# Patient Record
Sex: FEMALE | Race: WHITE
[De-identification: names, ages, dates, MRNs, and addresses within clinical notes are randomized per-mention and may not be internally consistent; named-entity substitution may affect disease eponyms.]

---

## 2017-03-18 ENCOUNTER — HOSPITAL ENCOUNTER (EMERGENCY)
Dept: HOSPITAL 62 - ER | Age: 37
Discharge: HOME | End: 2017-03-18
Payer: COMMERCIAL

## 2017-03-18 VITALS — SYSTOLIC BLOOD PRESSURE: 138 MMHG | DIASTOLIC BLOOD PRESSURE: 74 MMHG

## 2017-03-18 DIAGNOSIS — J06.9: Primary | ICD-10-CM

## 2017-03-18 DIAGNOSIS — R68.84: ICD-10-CM

## 2017-03-18 DIAGNOSIS — J02.9: ICD-10-CM

## 2017-03-18 PROCEDURE — 87880 STREP A ASSAY W/OPTIC: CPT

## 2017-03-18 PROCEDURE — 87804 INFLUENZA ASSAY W/OPTIC: CPT

## 2017-03-18 PROCEDURE — 87070 CULTURE OTHR SPECIMN AEROBIC: CPT

## 2017-03-18 PROCEDURE — 99283 EMERGENCY DEPT VISIT LOW MDM: CPT

## 2017-03-18 NOTE — ER DOCUMENT REPORT
ED Flu Like





- General


Chief Complaint: Sore Throat


Stated Complaint: JAW PAIN/SWELLING


Time seen by provider: 17:50


Mode of Arrival: Ambulatory


Information source: Patient


Notes: 


36-year-old female presents to ED for cough runny nose postnasal drip with sore 

throat for 2 weeks she states is getting worse in the last 7 days.  No fevers.  

Last menstrual period was 02/29/2017


TRAVEL OUTSIDE OF THE [place] IN LAST 30 DAYS: No





- HPI


Onset: Other - 2 weeks


Timing/Duration: Constant


Quality of pain: Achy, Other - Sore throat


Severity: Moderate


Pain Level: 3


CO exposure: No


Associated symptoms: Body/muscle aches, Rhinnorhea, Sinus pain/drainage, Sore 

throat


Similar symptoms previously: Yes


Recently seen / treated by doctor: No





- Related Data


Allergies/Adverse Reactions: 


 





latex Allergy (Verified 03/18/17 16:22)


 


pantoprazole [From Protonix] Allergy (Verified 03/18/17 16:22)


 











Past Medical History





- General


Information source: Patient





- Social History


Smoking Status: Never Smoker


Cigarette use (# per day): No


Chew tobacco use (# tins/day): No


Smoking Education Provided: No


Frequency of alcohol use: None


Drug Abuse: None


Family History: Reviewed & Not Pertinent


Patient has suicidal ideation: No


Patient has homicidal ideation: No





- Past Medical History


Cardiac Medical History: Reports: None


Pulmonary Medical History: Reports: None


EENT Medical History: Reports: None


Neurological Medical History: Reports: None


Endocrine Medical History: Reports: None


Renal/ Medical History: Reports: None


Malignancy Medical History: Reports: None


GI Medical History: Reports: None


Musculoskeltal Medical History: Reports None


Skin Medical History: Reports None


Psychiatric Medical History: Reports: None


Traumatic Medical History: Reports: None


Infectious Medical History: Reports: None


Surgical Hx: Negative


Past Surgical History: Reports: None





Review of Systems





- Review of Systems


Constitutional: Recent illness


EENT: Nose discharge, Sinus discharge, Throat pain, Difficulty swallowing


Cardiovascular: No symptoms reported


Respiratory: No symptoms reported


Gastrointestinal: No symptoms reported


Genitourinary: No symptoms reported


Female Genitourinary: No symptoms reported


Musculoskeletal: No symptoms reported


Skin: No symptoms reported


Hematologic/Lymphatic: No symptoms reported


Neurological/Psychological: No symptoms reported


-: Yes All other systems reviewed and negative





Physical Exam





- Vital signs


Vitals: 


 











Temp Pulse Resp BP Pulse Ox


 


 98.4 F   84   16   141/74 H  95 


 


 03/18/17 16:17  03/18/17 16:17  03/18/17 16:17  03/18/17 16:17  03/18/17 16:17











Interpretation: Normal





- General


General appearance: Appears well, Alert





- HEENT


Head: Normocephalic, Atraumatic


Eyes: Normal


Pupils: PERRL


Ears: Normal


External canal: Normal


Tympanic membrane: Normal


Sinus: Normal


Nasal: Purulent discharge, Swelling


Mouth/Lips: Normal


Mucous membranes: Normal


Pharynx: Post nasal drainage.  No: Erythema, Exudate, Peritonsillar abscess, 

Tonsillar hypertrophy, Uvular edema, Potential airway comprom.


Neck: Normal





- Respiratory


Respiratory status: No respiratory distress


Chest status: Nontender


Breath sounds: Nonproductive cough


Chest palpation: Normal





- Cardiovascular


Rhythm: Regular


Heart sounds: Normal auscultation


Murmur: No





- Abdominal


Inspection: Normal


Distension: No distension


Bowel sounds: Normal


Tenderness: Nontender


Organomegaly: No organomegaly





- Back


Back: Normal, Nontender





- Extremities


General upper extremity: Normal inspection, Nontender, Normal color, Normal ROM

, Normal temperature


General lower extremity: Normal inspection, Nontender, Normal color, Normal ROM

, Normal temperature, Normal weight bearing.  No: Tatianna's sign





- Neurological


Neuro grossly intact: Yes


Cognition: Normal


Orientation: AAOx4


[place] Coma Scale Eye Opening: Spontaneous


[place] Coma Scale Verbal: Oriented


[place] Coma Scale Motor: Obeys Commands


[place] Coma Scale Total: 15


Speech: Normal


Motor strength normal: LUE, RUE, LLE, RLE


Sensory: Normal





- Psychological


Associated symptoms: Normal affect, Normal mood





- Skin


Skin Temperature: Warm


Skin Moisture: Dry


Skin Color: Normal





Course





- Re-evaluation


Re-evalutation: 





03/18/17 18:57


Consult to Dr. Erazo as the patient states that the right side of her face is 

swollen.  He states there might be minimal swelling but nothing of concern 

discussed limiting drops and Cipro can do with the patient.  Patient verbalized 

understanding.





- Vital Signs


Vital signs: 


 











Temp Pulse Resp BP Pulse Ox


 


 98.4 F   84   16   141/74 H  95 


 


 03/18/17 16:17  03/18/17 16:17  03/18/17 16:17  03/18/17 16:17  03/18/17 16:17














Discharge





- Discharge


Clinical Impression: 


Upper respiratory infection


Qualifiers:


 URI type: unspecified URI Qualified Code(s): J06.9 - Acute upper respiratory 

infection, unspecified





Condition: Stable


Disposition: HOME, SELF-CARE


Instructions:  Family Physicians / Practices


Additional Instructions: 


UPPER RESPIRATORY ILLNESS:





     You have a viral infection of the respiratory passages -- a "cold."  This 

common infection causes nasal congestion, drainage, and often sore throat and 

cough.  It is highly contagious.  The disease usually lasts about 10 to 14 days.


     There is no "cure" for the viral infection -- it must run its course.  If 

there is a complication, such as bacterial infection in the nose, sinuses, 

middle ear, or bronchial tubes, antibiotics may be required.  The antibiotics 

won't affect the virus.


     Drink plenty of fluids.  A humidifier may help.  An expectorant medication 

or decongestant may make you more comfortable.  Use acetaminophen or ibuprofen 

for fever or aches.


     See the doctor if fever persists over two days, if there is any 

significant worsening of your symptoms, or if you simply fail to improve as 

expected.








DECONGESTANT MEDICATION:


     A decongestant medicine has been suggested.  Often this medicine is 

combined in the same tablet with an antihistamine or expectorant. This type of 

medicine is helpful in treating a bad cold or sinus condition, as well as in 

treatment of the nasal congestion of hay fever.  It is not of much benefit for 

lung infections.


     Decongestant medicines are related to stimulants.  They can cause an 

increase in blood pressure and heart rate.  Persons with heart disease and high 

blood pressure should not take decongestants without discussing this with the 

physician.


     If you develop palpitations, chest pain, headache, or tremors, stop the 

medicine and consult your physician.








COUGH-SUPPRESSANT & EXPECTORANT MEDICATION:


     You are to use a cough medication as needed for relief of symptoms.  This 

medicine is a combination of an expectorant (to make the mucous thinner and 

more easily "coughed up") and a cough suppressant (to reduce the frequency of 

coughing).


     The cough-suppressant medicine is related to narcotics.  You may 

experience mild nausea and sleepiness.  Some patients who are very sensitive to 

narcotics may have stomach pain from this medicine. Taking the medicine with 

food reduces these side effects.  Do not drive or work with machinery until you 

know how this medicine affects you.


     The expectorant should have no side effects.  Iodine-containing 

expectorants (such as organidin) should not be taken by persons with active 

thyroid disease unless approved by your doctor.


     Call the doctor if you develop shortness of breath, hives, rash, itching, 

lightheadedness, or severe nausea and vomiting.








USE OF ACETAMINOPHEN (Tylenol):


     Acetaminophen may be taken for pain relief or fever control. It's much 

safer than aspirin, offering a wider range of "safe" dosages.  It is safe 

during pregnancy.  Some brand names are Tylenol, Panadol, Datril, Anacin 3, 

Tempra, and Liquiprin. Acetaminophen can be repeated every four hours.  The 

following are maximum recommended dosages:


>89 pounds or adults          650 mg to 900 mg


Acetaminophen can be repeated every four hours.  Maximum dose not to exceed 

4000 mg a day.








SMOKING:


     If you smoke, you should stop smoking.  The tar and chemicals in cigarette 

smoke are harmful.  Smoking has been shown to cause:


          emphysema


          chronic bronchitis


          lung cancer


          mouth and throat cancer


          stomach and pancreas cancer


          premature aging


          birth defects


     In addition, smoking increases ear and lung infections in children of 

smokers.








FOLLOW-UP CARE:


If you have been referred to a physician for follow-up care, call the physician

s office for an appointment as you were instructed or within the next two days.

  If you experience worsening or a significant change in your symptoms, notify 

the physician immediately or return to the Emergency Department at any time for 

re-evaluation.





Forms:  Elevated Blood Pressure, Return to Work

## 2017-03-18 NOTE — ER DOCUMENT REPORT
ED Medical Screen (RME)





- General


Stated Complaint: JAW PAIN/SWELLING


Notes: 


Patient is a 36-year-old female presents emergency Department complaining of 

difficulty swallowing/sore throat for the past 2 weeks but is worse over the 

past 7 days.  Denies any fevers, chills.  Patient states that she never 

received a MMR vaccine.





Neck with range of motion.  Patient nontoxic looking.








I have greeted and performed a rapid initial assessment of this patient. A 

comprehensive ED assessment and evaluation of the patient, analysis of test 

results and completion of the medical decision making process will be conducted 

by additional ED providers.





Physical Exam





- Vital signs


Vitals: 





 











Temp Pulse Resp BP Pulse Ox


 


 98.4 F   84   16   141/74 H  95 


 


 03/18/17 16:17  03/18/17 16:17  03/18/17 16:17  03/18/17 16:17  03/18/17 16:17














Course





- Vital Signs


Vital signs: 





 











Temp Pulse Resp BP Pulse Ox


 


 98.4 F   84   16   141/74 H  95 


 


 03/18/17 16:17  03/18/17 16:17  03/18/17 16:17  03/18/17 16:17  03/18/17 16:17

## 2019-02-02 ENCOUNTER — HOSPITAL ENCOUNTER (EMERGENCY)
Dept: HOSPITAL 62 - ER | Age: 39
Discharge: HOME | End: 2019-02-02
Payer: SELF-PAY

## 2019-02-02 VITALS — SYSTOLIC BLOOD PRESSURE: 141 MMHG | DIASTOLIC BLOOD PRESSURE: 83 MMHG

## 2019-02-02 DIAGNOSIS — Z88.8: ICD-10-CM

## 2019-02-02 DIAGNOSIS — Z91.040: ICD-10-CM

## 2019-02-02 DIAGNOSIS — M79.661: ICD-10-CM

## 2019-02-02 DIAGNOSIS — L03.115: Primary | ICD-10-CM

## 2019-02-02 DIAGNOSIS — L03.116: ICD-10-CM

## 2019-02-02 PROCEDURE — 99283 EMERGENCY DEPT VISIT LOW MDM: CPT

## 2019-02-02 PROCEDURE — 93970 EXTREMITY STUDY: CPT

## 2019-02-02 NOTE — ER DOCUMENT REPORT
ED Extremity Problem, Lower





- General


Chief Complaint: Leg Pain


Stated Complaint: LEG PAIN


Time Seen by Provider: 02/02/19 17:07


Primary Care Provider: 


MELISSA LEWIS MD [ACTIVE STAFF] - 02/04/19


Mode of Arrival: Ambulatory


Information source: Patient


Notes: 





38-year-old very obese female with a weight of 130.8 kg and a BMI of 45.2 

presented to ED for complaint of pain to the right lower leg.  She states she 

has had burning and pain with ambulation this started yesterday.  She states she

has been seen by her primary care doctor for stasis ulcers and varicose veins 

but the legs have gotten larger and more painful with redness to the anterior 

medial aspect of the right lower leg.  She also has erythema and swelling to the

left anterior lower leg.  Patient is alert and oriented respirations regular and

unlabored speaking in full sentences walks with.


TRAVEL OUTSIDE OF THE [place] IN LAST 30 DAYS: No





- HPI


Patient complains to provider of: Pain, Swelling.  No: Injury


Location: Leg


Occurred: Yesterday


Onset/Duration: Gradual


Quality of pain: Burning


Severity: Moderate


Pain Level: 3


Recent injury: No


Associated symptoms: Painful ambulation, Other - Pulling and erythema to the 

front of bilateral legs to the medial aspect of the right lower leg


Exacerbated by: Movement, Walking


Relieved by: Nothing





- Related Data


Allergies/Adverse Reactions: 


                                        





latex Allergy (Verified 02/02/19 14:58)


   


pantoprazole [From Protonix] Allergy (Verified 02/02/19 14:58)


   











Past Medical History





- General


Information source: Patient





- Social History


Smoking Status: Never Smoker


Chew tobacco use (# tins/day): No


Frequency of alcohol use: Occasional


Drug Abuse: None


Lives with: Alone - His son


Family History: Reviewed & Not Pertinent


Patient has suicidal ideation: No


Patient has homicidal ideation: No





- Past Medical History


Cardiac Medical History: Reports: None


Pulmonary Medical History: Reports: None


EENT Medical History: Reports: None


Neurological Medical History: Reports: None


Endocrine Medical History: Reports: None


Renal/ Medical History: Reports: None


Skin Medical History: Reports Other - Stasis ulcers with varicose veins to 

bilateral lower extremities


Psychiatric Medical History: Reports: None


Traumatic Medical History: Reports: None


Infectious Medical History: Reports: None


Past Surgical History: Reports: Hx Gynecologic Surgery - Vaginal wall repair, E 

sure, Hx Orthopedic Surgery - Carpal tunnel, Hx Tonsillectomy





Review of Systems





- Review of Systems


Constitutional: No symptoms reported


EENT: No symptoms reported


Cardiovascular: No symptoms reported


Respiratory: No symptoms reported


Gastrointestinal: No symptoms reported


Genitourinary: No symptoms reported


Female Genitourinary: No symptoms reported


Musculoskeletal: Leg swelling, Ankle swelling, Other - Pain and erythema to 

bilateral lower legs


Skin: No symptoms reported


Hematologic/Lymphatic: No symptoms reported


Neurological/Psychological: No symptoms reported





Physical Exam





- Vital signs


Vitals: 


                                        











Temp Pulse Resp BP Pulse Ox


 


 98.2 F   79   14   145/78 H  100 


 


 02/02/19 15:06  02/02/19 15:06  02/02/19 15:06  02/02/19 15:06  02/02/19 15:06











Interpretation: Normal





- General


General appearance: Appears well, Alert





- HEENT


Head: Normocephalic, Atraumatic


Eyes: Normal


Pupils: PERRL





- Respiratory


Respiratory status: No respiratory distress


Chest status: Nontender


Breath sounds: Normal


Chest palpation: Normal





- Cardiovascular


Rhythm: Regular


Heart sounds: Normal auscultation


Murmur: No





- Abdominal


Inspection: Normal


Distension: No distension


Bowel sounds: Normal


Tenderness: Nontender


Organomegaly: No organomegaly





- Back


Back: Normal, Nontender





- Extremities


General upper extremity: Normal inspection, Nontender, Normal color, Normal ROM,

Normal temperature


General lower extremity: Normal ROM, Normal temperature, Normal weight bearing. 

No: Tatianna's sign


Calf: Tender, Other - Erythematous swelling


Ankle: Tender, Edema, Other - Erythematous


Foot: Normal, Nontender





- Neurological


Neuro grossly intact: Yes


Cognition: Normal


Orientation: AAOx4


[place] Coma Scale Eye Opening: Spontaneous


[place] Coma Scale Verbal: Oriented


[place] Coma Scale Motor: Obeys Commands


[place] Coma Scale Total: 15


Speech: Normal


Motor strength normal: LUE, RUE, LLE, RLE


Sensory: Normal





- Psychological


Associated symptoms: Normal affect, Normal mood





- Skin


Skin Temperature: Warm


Skin Moisture: Dry


Skin Color: Normal





Course





- Re-evaluation


Re-evalutation: 





02/03/19 01:59


Doppler was negative for any blood clots.  Patient was treated with Keflex and 

discharged home with prescription for Keflex for her cellulitis to bilateral 

lower remedies and instructed him to follow-up with her primary doctor.  Patient

verbalized understanding and agreement with treatment plan.  Patient also 

instructed to elevate her legs to decrease the swelling.





- Vital Signs


Vital signs: 


                                        











Temp Pulse Resp BP Pulse Ox


 


 98.8 F   81   14   141/83 H  99 


 


 02/02/19 18:38  02/02/19 18:38  02/02/19 15:06  02/02/19 18:42  02/02/19 18:38














- Diagnostic Test


Radiology reviewed: Image reviewed, Reports reviewed





Discharge





- Discharge


Clinical Impression: 


 Cellulitis of both lower extremities





Condition: Stable


Disposition: HOME, SELF-CARE


Additional Instructions: 


CELLULITIS:





     You have an infection of your skin and underlying soft tissues called ce

llulitis.  This is due to bacteria, which can enter through any break in the 

skin, or even through an irritated hair follicle.  Untreated, cellulitis will 

usually worsen.


     Antibiotics are required.  Usually, warm packs or warm soaks, and elevation

of the infected area are recommended.  You should start getting better within 24

to 36 hours.


     Most infections respond quickly to the right medication. Follow-up care is 

important, however, to check for abscess (boil) formation, unsuspected foreign 

body, or resistant infection.


     If you develop fever, chills, or if the area of infection is becoming 

rapidly more swollen or painful, call the doctor at once.





ANTIBIOTIC THERAPY:


     You have been given an antibiotic prescription.  It's important that you 

take all the medication, unless instructed otherwise by your physician.  Failure

to complete the entire course can result in relapse of your condition.


     Common side effects of antibiotics include nausea, intestinal cramping, or 

diarrhea.  Women may develop vaginal yeast infections, and babies can get yeast 

(thrush) in the mouth following the use of antibiotics.  Contact your physician 

if you develop significant side effects from this medication.


     Allergy to this antibiotic can result in hives, wheezing, faintness, or 

itching.  If symptoms of allergy occur, stop the medication and call the doctor.





Cephalexin





     The antibiotic you've been prescribed is a member of the cephalosporin 

class.  This type of antibiotic covers a wide variety of infections, including 

those of the skin, lungs, and urinary tract. It's useful for staph infections.


     This antibiotic is slightly similar to the penicillin family. In rare 

cases, a person who is allergic to penicillin will also be allergic to this 

medication.  If you have had a severe allergic reaction to penicillin, and have 

not taken this antibiotic since that time, notify your doctor.


     Antibiotics which cover many germs ("broad spectrum" antibiotics) are more 

likely to cause diarrhea or "yeast" infections.  Women prone to vaginal yeast 

problems may suffer an attack after taking this antibiotic.  In infants, oral 

thrush (white spots "stuck" on the cheek) or yeast diaper rash may result.  See 

your doctor if these problems occur.  Call at once if you develop itching, 

hives, shortness of breath, or lightheadedness.








Elevation & Warmth





     The area should be elevated as much as possible over the next 48 hours.  

Try to keep it above the level of your heart.


     Apply gentle heat (such as a heating pad or hot water bottle) for about 20 

to 30 minutes about every two hours -- at least four times daily.  Warmth and 

elevation will help you make a more rapid recovery, and will ease the pain 

considerably.








FOLLOW-UP CARE:


If you have been referred to a physician for follow-up care, call the 

physicians office for an appointment as you were instructed or within the next 

two days.  If you experience worsening or a significant change in your symptoms,

notify the physician immediately or return to the Emergency Department at any 

time for re-evaluation.


Prescriptions: 


Cephalexin Monohydrate [Keflex 500 mg Capsule] 500 mg PO Q6H 5 Days  capsule


Forms:  Elevated Blood Pressure


Referrals: 


MELISSA LEWIS MD [ACTIVE STAFF] - 02/04/19

## 2019-02-03 NOTE — XCELERA REPORT
[address]

                             [facility] [address]

                               Tel: [phone]

                               Fax: [phone]



                       Lower Extremity Venous Evaluation

_______________________________________________________________________________



_______________________________________________________________________________

Procedure: Color flow and duplex imaging bilaterally of the veins of the lower

extremities as well as the Common Femoral veins.





_______________________________________________________________________________



_______________________________________________________________________________



Right Sided Venous Evaluation

Normal vessel filling wall to wall, compression and augmentation as well as

Colour flow down to the infrageniculate veins.



Left Sided Venous Evaluation

Normal vessel filling wall to wall, compression and augmentation as well as

Colour flow down to the infrageniculate veins.



_______________________________________________________________________________



Interpretation Summary

No duplex evidence of DVT or obstruction in the bilateral lower extremities.

_______________________________________________________________________________



Name: SERAFIN LOFTON

MRN: [id number]

Age: 38 yrs

Gender: Female                                           : 1980

Patient Status: Emergency                                Patient Location: ER

Account #: [id number]

Study Date: 2019 05:42 PM

                                Accession #: [id number]

Reason For Study: Pain and swelling

Ordering Physician: MUNA MIRANDA

Performed By: Judah Champion

Electronically signed by:      Lennox Williams      on 2019 01:17 PM



CC: MUNA MIRANDA

>

Williams, Lennox

## 2019-06-09 ENCOUNTER — HOSPITAL ENCOUNTER (EMERGENCY)
Dept: HOSPITAL 62 - ER | Age: 39
LOS: 1 days | Discharge: ANOTHER HOSPITAL | End: 2019-06-10
Payer: SELF-PAY

## 2019-06-09 DIAGNOSIS — R68.84: ICD-10-CM

## 2019-06-09 DIAGNOSIS — I21.4: Primary | ICD-10-CM

## 2019-06-09 DIAGNOSIS — Z88.8: ICD-10-CM

## 2019-06-09 DIAGNOSIS — R61: ICD-10-CM

## 2019-06-09 DIAGNOSIS — R06.02: ICD-10-CM

## 2019-06-09 DIAGNOSIS — M79.602: ICD-10-CM

## 2019-06-09 DIAGNOSIS — Z91.040: ICD-10-CM

## 2019-06-09 DIAGNOSIS — R07.89: ICD-10-CM

## 2019-06-09 DIAGNOSIS — M79.601: ICD-10-CM

## 2019-06-09 DIAGNOSIS — R11.0: ICD-10-CM

## 2019-06-09 LAB
ADD MANUAL DIFF: NO
ANION GAP SERPL CALC-SCNC: 9 MMOL/L (ref 5–19)
BASOPHILS # BLD AUTO: 0 10^3/UL (ref 0–0.2)
BASOPHILS NFR BLD AUTO: 0.4 % (ref 0–2)
BUN SERPL-MCNC: 12 MG/DL (ref 7–20)
CALCIUM: 9.2 MG/DL (ref 8.4–10.2)
CHLORIDE SERPL-SCNC: 103 MMOL/L (ref 98–107)
CO2 SERPL-SCNC: 27 MMOL/L (ref 22–30)
EOSINOPHIL # BLD AUTO: 0.1 10^3/UL (ref 0–0.6)
EOSINOPHIL NFR BLD AUTO: 1.3 % (ref 0–6)
ERYTHROCYTE [DISTWIDTH] IN BLOOD BY AUTOMATED COUNT: 15.1 % (ref 11.5–14)
GLUCOSE SERPL-MCNC: 95 MG/DL (ref 75–110)
HCT VFR BLD CALC: 36.8 % (ref 36–47)
HGB BLD-MCNC: 12.5 G/DL (ref 12–15.5)
LYMPHOCYTES # BLD AUTO: 1.8 10^3/UL (ref 0.5–4.7)
LYMPHOCYTES NFR BLD AUTO: 27 % (ref 13–45)
MCH RBC QN AUTO: 28 PG (ref 27–33.4)
MCHC RBC AUTO-ENTMCNC: 33.9 G/DL (ref 32–36)
MCV RBC AUTO: 83 FL (ref 80–97)
MONOCYTES # BLD AUTO: 0.5 10^3/UL (ref 0.1–1.4)
MONOCYTES NFR BLD AUTO: 7.2 % (ref 3–13)
NEUTROPHILS # BLD AUTO: 4.3 10^3/UL (ref 1.7–8.2)
NEUTS SEG NFR BLD AUTO: 64.1 % (ref 42–78)
PLATELET # BLD: 231 10^3/UL (ref 150–450)
POTASSIUM SERPL-SCNC: 4 MMOL/L (ref 3.6–5)
RBC # BLD AUTO: 4.46 10^6/UL (ref 3.72–5.28)
SODIUM SERPL-SCNC: 139.3 MMOL/L (ref 137–145)
TOTAL CELLS COUNTED % (AUTO): 100 %
WBC # BLD AUTO: 6.7 10^3/UL (ref 4–10.5)

## 2019-06-09 PROCEDURE — 71045 X-RAY EXAM CHEST 1 VIEW: CPT

## 2019-06-09 PROCEDURE — 36415 COLL VENOUS BLD VENIPUNCTURE: CPT

## 2019-06-09 PROCEDURE — 93005 ELECTROCARDIOGRAM TRACING: CPT

## 2019-06-09 PROCEDURE — 80048 BASIC METABOLIC PNL TOTAL CA: CPT

## 2019-06-09 PROCEDURE — 96372 THER/PROPH/DIAG INJ SC/IM: CPT

## 2019-06-09 PROCEDURE — 93010 ELECTROCARDIOGRAM REPORT: CPT

## 2019-06-09 PROCEDURE — 99285 EMERGENCY DEPT VISIT HI MDM: CPT

## 2019-06-09 PROCEDURE — 85025 COMPLETE CBC W/AUTO DIFF WBC: CPT

## 2019-06-09 PROCEDURE — 84484 ASSAY OF TROPONIN QUANT: CPT

## 2019-06-09 NOTE — EKG REPORT
SEVERITY:- ABNORMAL ECG -

SINUS RHYTHM

INCOMPLETE RIGHT BUNDLE BRANCH BLOCK

:

Confirmed by: David Garza MD 09-Jun-2019 22:36:41

## 2019-06-09 NOTE — RADIOLOGY REPORT (SQ)
XR CHEST 1 VIEW



HISTORY: Chest pain.



COMPARISON: None.



FINDINGS:



The heart size is within normal limits. No consolidation, pleural

effusion, or pneumothorax is seen. There are no acute bony

findings.



IMPRESSION:



No evidence of acute cardiopulmonary disease.

## 2019-06-10 VITALS — SYSTOLIC BLOOD PRESSURE: 163 MMHG | DIASTOLIC BLOOD PRESSURE: 86 MMHG

## 2019-06-10 NOTE — ER DOCUMENT REPORT
ED General





- General


Chief Complaint: Chest Tightness


Stated Complaint: CHEST TIGHTNESS


Time Seen by Provider: 06/09/19 22:51


Primary Care Provider: 


MELISSA LEWIS MD [Primary Care Provider] - Follow up as needed


Notes: 





Patient is a 39-year-old female with a past medical history of morbid obesity, 

chronic venous stasis ulcers, who presents with complaints of an episode of 

chest pain that started earlier this evening it has spontaneously resolved.  

Approximately 20 minutes prior to presentation the patient states that she was 

cleaning at work, developed acute onset of crushing chest pain that radiated 

into the bilateral extremities and into the jaw.  States that it was severe when

present.  No exacerbating or alleviating factor.  States that the symptoms have 

resolved spontaneously.  Currently denies any symptoms.  At the time of pain she

did have associated shortness of breath and nausea as well as some mild 

diaphoresis.  Denies any history of similar symptoms in the past.  Denies any 

known history of coronary artery disease.  Does not have a primary care doctor.


TRAVEL OUTSIDE OF THE [place] IN LAST 30 DAYS: No





- Related Data


Allergies/Adverse Reactions: 


                                        





latex Allergy (Verified 02/02/19 14:58)


   


pantoprazole [From Protonix] Allergy (Verified 02/02/19 14:58)


   











Past Medical History





- General


Information source: Patient





- Social History


Smoking Status: Never Smoker


Frequency of alcohol use: None


Drug Abuse: None


Lives with: Family


Family History: Reviewed & Not Pertinent


Patient has suicidal ideation: No


Patient has homicidal ideation: No


Renal/ Medical History: Denies: Hx Peritoneal Dialysis


Past Surgical History: Reports: Hx Gynecologic Surgery - Vaginal wall repair, E 

sure, Hx Orthopedic Surgery - Carpal tunnel, Hx Tonsillectomy





Review of Systems





- Review of Systems


Notes: 





Constitutional: Negative for fever.


HENT: Negative for sore throat.


Eyes: Negative for visual changes.


Cardiovascular: Positive for chest pain.


Respiratory: Positive for shortness of breath.


Gastrointestinal: Negative for abdominal pain, vomiting or diarrhea.


Genitourinary: Negative for dysuria.


Musculoskeletal: Negative for back pain.


Skin: Negative for rash.


Neurological: Negative for headaches, weakness or numbness.





10 point ROS negative except as marked above and in HPI.





Physical Exam





- Vital signs


Vitals: 


                                        











Temp Pulse Resp BP Pulse Ox


 


 97.9 F   83   20   148/95 H  100 


 


 06/09/19 21:24  06/09/19 21:24  06/09/19 21:24  06/09/19 21:24  06/09/19 21:24











Interpretation: Hypertensive


Notes: 





PHYSICAL EXAMINATION:





GENERAL: Well-appearing, well-nourished and in no acute distress.





HEAD: Atraumatic, normocephalic.





EYES: Pupils equal round and reactive to light, extraocular movements intact, 

sclera anicteric, conjunctiva are normal.





ENT: nares patent, oropharynx clear without exudates.  Moist mucous membranes.





NECK: Normal range of motion, supple without lymphadenopathy





LUNGS: Breath sounds clear to auscultation bilaterally and equal.  No wheezes 

rales or rhonchi.





HEART: Regular rate and rhythm without murmurs





ABDOMEN: Soft, morbidly obese abdomen, nontender, normoactive bowel sounds.  No 

guarding, no rebound.  No masses appreciated.





EXTREMITIES: Normal range of motion, no pitting or edema.  No cyanosis.





NEUROLOGICAL: No focal neurological deficits. Moves all extremities 

spontaneously and on command.





PSYCH: Normal mood, normal affect.





SKIN: Warm, Dry, normal turgor, no rashes or lesions noted.





Course





- Re-evaluation


Re-evalutation: 





06/10/19 00:21


Patient presents with a concerning history for ACS with history of chest 

tightness with bilateral upper extremity pain, jaw pain, diaphoresis and 

shortness of breath that started acutely while cleaning.  Symptoms have 

completely resolved without intervention, patient currently denies any symptoms 

of any kind.  Initial EKG without ischemic changes.  Initial troponin is however

elevated at 0.472 certainly concerning to given that this troponin was obtained 

proximal 1 hour after the onset of patient's pain.  She has been given aspirin, 

atorvastatin, enoxaparin.  We do not have the ability to perform cardiac 

catheterization here until Tuesday and patient has subsequently been transferred

to [facility].  She has been accepted by Dr. Long.  

Will continue to monitor.





- Vital Signs


Vital signs: 


                                        











Temp Pulse Resp BP Pulse Ox


 


 98.2 F   83   18   163/86 H  100 


 


 06/10/19 02:01  06/09/19 21:24  06/10/19 02:01  06/10/19 02:01  06/10/19 02:01














- Laboratory


Result Diagrams: 


                                 06/09/19 22:45





                                 06/09/19 22:45


Laboratory results interpreted by me: 


                                        











  06/09/19





  22:45


 


RDW  15.1 H














- Diagnostic Test


Radiology reviewed: Image reviewed, Reports reviewed


Radiology results interpreted by me: 





06/10/19 00:22


Chest x-ray: No acute infiltrate pneumothorax





- EKG Interpretation by Me


Additional EKG results interpreted by me: 





06/10/19 00:22


Sinus rhythm, rate 68, no ST elevations or depressions.  QTC is 447





Discharge





- Discharge


Clinical Impression: 


 NSTEMI (non-ST elevated myocardial infarction)





Condition: Fair


Disposition: [facility]


Referrals: 


MELISSA LEWIS MD [Primary Care Provider] - Follow up as needed

## 2019-12-26 ENCOUNTER — HOSPITAL ENCOUNTER (EMERGENCY)
Dept: HOSPITAL 62 - ER | Age: 39
Discharge: HOME | End: 2019-12-26
Payer: SELF-PAY

## 2019-12-26 VITALS — DIASTOLIC BLOOD PRESSURE: 74 MMHG | SYSTOLIC BLOOD PRESSURE: 129 MMHG

## 2019-12-26 DIAGNOSIS — Z91.040: ICD-10-CM

## 2019-12-26 DIAGNOSIS — L03.115: Primary | ICD-10-CM

## 2019-12-26 LAB
ADD MANUAL DIFF: NO
ALBUMIN SERPL-MCNC: 4.3 G/DL (ref 3.5–5)
ALP SERPL-CCNC: 80 U/L (ref 38–126)
ANION GAP SERPL CALC-SCNC: 13 MMOL/L (ref 5–19)
APPEARANCE UR: (no result)
APTT PPP: YELLOW S
AST SERPL-CCNC: 26 U/L (ref 14–36)
BASOPHILS # BLD AUTO: 0 10^3/UL (ref 0–0.2)
BASOPHILS NFR BLD AUTO: 0.5 % (ref 0–2)
BILIRUB DIRECT SERPL-MCNC: 0.3 MG/DL (ref 0–0.4)
BILIRUB SERPL-MCNC: 0.4 MG/DL (ref 0.2–1.3)
BILIRUB UR QL STRIP: NEGATIVE
BUN SERPL-MCNC: 16 MG/DL (ref 7–20)
CALCIUM: 9.1 MG/DL (ref 8.4–10.2)
CHLORIDE SERPL-SCNC: 102 MMOL/L (ref 98–107)
CO2 SERPL-SCNC: 23 MMOL/L (ref 22–30)
EOSINOPHIL # BLD AUTO: 0.1 10^3/UL (ref 0–0.6)
EOSINOPHIL NFR BLD AUTO: 1.5 % (ref 0–6)
ERYTHROCYTE [DISTWIDTH] IN BLOOD BY AUTOMATED COUNT: 14.8 % (ref 11.5–14)
GLUCOSE SERPL-MCNC: 95 MG/DL (ref 75–110)
GLUCOSE UR STRIP-MCNC: NEGATIVE MG/DL
HCT VFR BLD CALC: 40.7 % (ref 36–47)
HGB BLD-MCNC: 13.7 G/DL (ref 12–15.5)
KETONES UR STRIP-MCNC: NEGATIVE MG/DL
LYMPHOCYTES # BLD AUTO: 0.9 10^3/UL (ref 0.5–4.7)
LYMPHOCYTES NFR BLD AUTO: 15.8 % (ref 13–45)
MCH RBC QN AUTO: 28.3 PG (ref 27–33.4)
MCHC RBC AUTO-ENTMCNC: 33.7 G/DL (ref 32–36)
MCV RBC AUTO: 84 FL (ref 80–97)
MONOCYTES # BLD AUTO: 0.6 10^3/UL (ref 0.1–1.4)
MONOCYTES NFR BLD AUTO: 10.5 % (ref 3–13)
NEUTROPHILS # BLD AUTO: 4.3 10^3/UL (ref 1.7–8.2)
NEUTS SEG NFR BLD AUTO: 71.7 % (ref 42–78)
NITRITE UR QL STRIP: NEGATIVE
PH UR STRIP: 5 [PH] (ref 5–9)
PLATELET # BLD: 219 10^3/UL (ref 150–450)
POTASSIUM SERPL-SCNC: 4.2 MMOL/L (ref 3.6–5)
PROT SERPL-MCNC: 7.6 G/DL (ref 6.3–8.2)
PROT UR STRIP-MCNC: NEGATIVE MG/DL
RBC # BLD AUTO: 4.85 10^6/UL (ref 3.72–5.28)
SP GR UR STRIP: 1.03
TOTAL CELLS COUNTED % (AUTO): 100 %
UROBILINOGEN UR-MCNC: NEGATIVE MG/DL (ref ?–2)
WBC # BLD AUTO: 5.9 10^3/UL (ref 4–10.5)

## 2019-12-26 PROCEDURE — 99284 EMERGENCY DEPT VISIT MOD MDM: CPT

## 2019-12-26 PROCEDURE — 80053 COMPREHEN METABOLIC PANEL: CPT

## 2019-12-26 PROCEDURE — 81001 URINALYSIS AUTO W/SCOPE: CPT

## 2019-12-26 PROCEDURE — 87040 BLOOD CULTURE FOR BACTERIA: CPT

## 2019-12-26 PROCEDURE — 93971 EXTREMITY STUDY: CPT

## 2019-12-26 PROCEDURE — 36415 COLL VENOUS BLD VENIPUNCTURE: CPT

## 2019-12-26 PROCEDURE — 96365 THER/PROPH/DIAG IV INF INIT: CPT

## 2019-12-26 PROCEDURE — 85025 COMPLETE CBC W/AUTO DIFF WBC: CPT

## 2019-12-26 PROCEDURE — 81025 URINE PREGNANCY TEST: CPT

## 2019-12-26 NOTE — RADIOLOGY REPORT (SQ)
EXAM DESCRIPTION:  VENOUS UNILATERAL LOWER



COMPLETED DATE/TIME:  12/26/2019 5:05 pm



REASON FOR STUDY:  leg pain and swelling



COMPARISON:  None.



TECHNIQUE:  Dynamic and static gray scale and color images acquired of the right leg venous system. S
elected spectral images acquired with additional compression and augmentation maneuvers. The contrala
teral common femoral vein and saphenofemoral junction were also imaged. Images stored on PACS.



LIMITATIONS:  None.



FINDINGS:  COMMON FEMORAL: Normal phasicity, compression and augmentation. No visualized echogenic ma
terial on gray scale. No defects on color images.

FEMORAL: Normal compression and augmentation. No visualized echogenic material on gray scale. No defe
cts on color images.

POPLITEAL: Normal compression, augmentation. No visualized echogenic material on gray scale. No defec
ts on color images.

CALF VESSELS: Normal compression, augmentation. No visualized echogenic material on gray scale. No de
fects on color images.

GSV and SSV: Normal compression, augmentation. No visualized echogenic material on gray scale. No def
ects on color images.

ANY DEEP VENOUS INSUFFICIENCY: Not evaluated.

ANY EVIDENCE OF POPLITEAL CYST: No.

OTHER: No other significant finding.

CONTRALATERAL COMMON FEMORAL VEIN AND SAPHENOFEMORAL JUNCTION:

Normal phasicity, compression and augmentation. No visualized echogenic material on gray scale. No de
fects on color images.



IMPRESSION:  NO EVIDENCE OF DVT OR SVT IN THE RIGHT LEG.



TECHNICAL DOCUMENTATION:  JOB ID:  [id number]

 2011 [email or web address]- All Rights Reserved



Reading location - IP/workstation name: [email or web address]

## 2019-12-26 NOTE — ER DOCUMENT REPORT
ED Extremity Problem, Lower





- General


Chief Complaint: Leg Swelling


Stated Complaint: LEG PAIN


Time Seen by Provider: 12/26/19 15:20


Primary Care Provider: 


MELISSA LEWIS MD [Primary Care Provider] - Follow up as needed


Mode of Arrival: Ambulatory


Information source: Patient - Denies fever and chills


TRAVEL OUTSIDE OF THE [place] IN LAST 30 DAYS: No





- HPI


Patient complains to provider of: Swelling, Other - Erythema right lower extre

mity.  Patient has a history of cellulitis in her right lower extremity 6 months

ago.  There is been no insect bite or trauma to her lower extremity.


Occurred: Other


Severity: Moderate


Pain Level: 4





- Related Data


Allergies/Adverse Reactions: 


                                        





latex Allergy (Verified 12/26/19 15:35)


   


pantoprazole [From Protonix] Allergy (Verified 12/26/19 15:35)


   











Past Medical History





- General


Information source: Patient





- Social History


Smoking Status: Never Smoker


Chew tobacco use (# tins/day): No


Frequency of alcohol use: Rare


Drug Abuse: None


Family History: Reviewed & Not Pertinent


Patient has suicidal ideation: No


Patient has homicidal ideation: No





- Medical History


Medical History: Other - History of cellulitis and venous insufficiency of lower

extremities





- Past Medical History


Cardiac Medical History: Reports: None


Renal/ Medical History: Denies: Hx Peritoneal Dialysis


Skin Medical History: Reports Hx Cellulitis


Past Surgical History: Reports: Hx Gynecologic Surgery - Vaginal wall repair, E 

sure, Hx Orthopedic Surgery - Carpal tunnel, Hx Tonsillectomy





- Immunizations


Immunizations up to date: Yes





Review of Systems





- Review of Systems


Genitourinary: Other - Urinary tract infection.  Currently under treatment on 

metronidazole and Bactrim DS.


Skin: See HPI





Physical Exam





- Vital signs


Vitals: 


                                        











Temp Pulse Resp BP Pulse Ox


 


 98.5 F   78   16   147/95 H  98 


 


 12/26/19 14:39  12/26/19 14:39  12/26/19 14:39  12/26/19 14:39  12/26/19 14:39











Interpretation: Normal





- General


General appearance: Appears well, Alert





- HEENT


Head: Normocephalic, Atraumatic


Eyes: Normal


Pupils: PERRL





- Respiratory


Respiratory status: No respiratory distress


Chest status: Nontender


Breath sounds: Normal


Chest palpation: Normal





- Cardiovascular


Rhythm: Regular


Heart sounds: Normal auscultation


Murmur: No





- Abdominal


Inspection: Normal


Distension: No distension


Bowel sounds: Normal


Tenderness: Nontender


Organomegaly: No organomegaly





- Back


Back: Normal, Nontender





- Extremities


General upper extremity: Normal inspection, Nontender, Normal color, Normal ROM,

Normal temperature


General lower extremity: Normal inspection, Nontender, Normal color, Normal ROM,

Normal temperature, Normal weight bearing, Other - Right lower extremity with 

anterior redness and warmth with mild tenderness.  Swollen calves bilateral.  No

Homans sign..  No: Tatianna's sign


Left calf in cm: 21


Right calf in cm: 22





- Neurological


Neuro grossly intact: Yes


Cognition: Normal


Orientation: AAOx4


Jelena Coma Scale Eye Opening: Spontaneous


Jelena Coma Scale Verbal: Oriented


Jackson Coma Scale Motor: Obeys Commands


Jelena Coma Scale Total: 15


Speech: Normal


Motor strength normal: LUE, RUE, LLE, RLE


Sensory: Normal





- Psychological


Associated symptoms: Normal affect, Normal mood





- Skin


Skin Temperature: Warm - Written skin in the anterior lower tibial surface of 

the right lower extremity.  Not circumferential only on the 50% anterior section

of the lower extremity.


Skin Moisture: Dry


Skin Color: Normal





Course





- Vital Signs


Vital signs: 


                                        











Temp Pulse Resp BP Pulse Ox


 


 98.5 F   78   16   147/95 H  98 


 


 12/26/19 14:39  12/26/19 14:39  12/26/19 14:39  12/26/19 14:39  12/26/19 14:39














- Laboratory


Result Diagrams: 


                                 12/26/19 15:41





                                 12/26/19 15:41


Laboratory results interpreted by me: 


                                        











  12/26/19 12/26/19





  15:41 15:50


 


RDW  14.8 H 


 


Ur Leukocyte Esterase   SMALL H














- Diagnostic Test


Radiology reviewed: Image reviewed, Reports reviewed





Discharge





- Discharge


Clinical Impression: 


 Cellulitis





Condition: Fair


Disposition: HOME, SELF-CARE


Additional Instructions: 


Cellulitis





     You have an infection of your skin and underlying soft tissues called 

cellulitis.  This is due to bacteria, which can enter through any break in the 

skin, or even through an irritated hair follicle.  Untreated, cellulitis will 

usually worsen.


     Antibiotics are required.  Usually, warm packs or warm soaks, and elevation

of the infected area are recommended.  You should start getting better within 24

to 36 hours.


     Most infections respond quickly to the right medication. Follow-up care is 

important, however, to check for abscess (boil) formation, unsuspected foreign 

body, or resistant infection.


     If you develop fever, chills, or if the area of infection is becoming 

rapidly more swollen or painful, call the doctor at once.





Prescriptions: 


Clindamycin HCl 300 mg PO TID #30 capsule


Referrals: 


MELISSA LEWIS MD [Primary Care Provider] - Follow up as needed

## 2019-12-26 NOTE — ER DOCUMENT REPORT
ED Medical Screen (RME)





- General


Chief Complaint: Leg Pain


Stated Complaint: LEG PAIN


Time Seen by Provider: 12/26/19 15:20


Primary Care Provider: 


MELISSA LEWIS MD [Primary Care Provider] - Follow up as needed


Mode of Arrival: Ambulatory


Information source: Patient


Notes: 





39-year-old female with history of PVD and cellulitis presents emergency 

department with right lower leg pain swelling erythema.  She denies fever 

vomiting diarrhea.  Reports symptoms started Larose Jillian.  She is taking 

Septra and Flagyl currently for UTI.











I have greeted and performed a rapid initial assessment of this patient.  A 

comprehensive ED assessment and evaluation of the patient, analysis of test 

results and completion of the medical decision making process will be conducted 

by additional ED providers.


TRAVEL OUTSIDE OF THE [place] IN LAST 30 DAYS: No





- Related Data


Allergies/Adverse Reactions: 


                                        





latex Allergy (Verified 12/26/19 15:15)


   


pantoprazole [From Protonix] Allergy (Verified 12/26/19 15:15)


   











Past Medical History





- Social History


Chew tobacco use (# tins/day): No


Frequency of alcohol use: Rare


Drug Abuse: None


Renal/ Medical History: Denies: Hx Peritoneal Dialysis


Past Surgical History: Reports: Hx Gynecologic Surgery - Vaginal wall repair, E 

sure, Hx Orthopedic Surgery - Carpal tunnel, Hx Tonsillectomy





Physical Exam





- Vital signs


Vitals: 





                                        











Temp Pulse Resp BP Pulse Ox


 


 98.5 F   78   16   147/95 H  98 


 


 12/26/19 14:39  12/26/19 14:39  12/26/19 14:39  12/26/19 14:39  12/26/19 14:39














Course





- Vital Signs


Vital signs: 





                                        











Temp Pulse Resp BP Pulse Ox


 


 98.5 F   78   16   147/95 H  98 


 


 12/26/19 14:39  12/26/19 14:39  12/26/19 14:39  12/26/19 14:39  12/26/19 14:39














Doctor's Discharge





- Discharge


Referrals: 


MELISSA LEWIS MD [Primary Care Provider] - Follow up as needed

## 2020-03-06 LAB
APPEARANCE UR: (no result)
APTT PPP: YELLOW S
BILIRUB UR QL STRIP: NEGATIVE
ERYTHROCYTE [DISTWIDTH] IN BLOOD BY AUTOMATED COUNT: 14.9 % (ref 11.5–14)
GLUCOSE UR STRIP-MCNC: NEGATIVE MG/DL
HCT VFR BLD CALC: 38 % (ref 36–47)
HGB BLD-MCNC: 13.5 G/DL (ref 12–15.5)
KETONES UR STRIP-MCNC: 20 MG/DL
MCH RBC QN AUTO: 29.5 PG (ref 27–33.4)
MCHC RBC AUTO-ENTMCNC: 35.4 G/DL (ref 32–36)
MCV RBC AUTO: 83 FL (ref 80–97)
NITRITE UR QL STRIP: NEGATIVE
PH UR STRIP: 7 [PH] (ref 5–9)
PLATELET # BLD: 241 10^3/UL (ref 150–450)
PROT UR STRIP-MCNC: NEGATIVE MG/DL
RBC # BLD AUTO: 4.57 10^6/UL (ref 3.72–5.28)
SP GR UR STRIP: 1.01
UROBILINOGEN UR-MCNC: NEGATIVE MG/DL (ref ?–2)
WBC # BLD AUTO: 7.8 10^3/UL (ref 4–10.5)

## 2020-03-13 ENCOUNTER — HOSPITAL ENCOUNTER (OUTPATIENT)
Dept: HOSPITAL 62 - OROUT | Age: 40
Discharge: HOME | End: 2020-03-13
Attending: OBSTETRICS & GYNECOLOGY
Payer: MEDICAID

## 2020-03-13 VITALS — SYSTOLIC BLOOD PRESSURE: 134 MMHG | DIASTOLIC BLOOD PRESSURE: 78 MMHG

## 2020-03-13 DIAGNOSIS — N92.0: ICD-10-CM

## 2020-03-13 DIAGNOSIS — Z79.899: ICD-10-CM

## 2020-03-13 DIAGNOSIS — E66.9: ICD-10-CM

## 2020-03-13 DIAGNOSIS — N93.9: Primary | ICD-10-CM

## 2020-03-13 PROCEDURE — 81025 URINE PREGNANCY TEST: CPT

## 2020-03-13 PROCEDURE — 85027 COMPLETE CBC AUTOMATED: CPT

## 2020-03-13 PROCEDURE — 81001 URINALYSIS AUTO W/SCOPE: CPT

## 2020-03-13 PROCEDURE — 36415 COLL VENOUS BLD VENIPUNCTURE: CPT

## 2020-03-13 PROCEDURE — 88305 TISSUE EXAM BY PATHOLOGIST: CPT

## 2020-03-13 PROCEDURE — 00952 ANES VAG PX HYSTSC&/HSG: CPT

## 2020-03-13 PROCEDURE — 58563 HYSTEROSCOPY ABLATION: CPT

## 2020-03-13 NOTE — OPERATIVE REPORT
Operative Report


DATE OF SURGERY: 03/13/20


PREOPERATIVE DIAGNOSIS: Heavy menses


POSTOPERATIVE DIAGNOSIS: Heavy menses


OPERATION: Hysteroscopy D&C and NovaSure ablation


SURGEON: LAURA CRENSHAW


ANESTHESIA: GA


TISSUE REMOVED OR ALTERED: Uterine contents


COMPLICATIONS: 





None


ESTIMATED BLOOD LOSS: None


INTRAOPERATIVE FINDINGS: Uterine cavity is 4 cm in length and 4-1/2 cm in width


PROCEDURE: 





Patient was taken the OR and placed in supine position.  General anesthesia was 

induced.  She is placed in a low dorsolithotomy position using Dorian stirrups.  

Perineum and vagina were prepared and draped in sterile fashion.  Her bladder 

had been emptied previously and did not need catheterization.  A weighted 

speculum was placed in the vagina and the anterior lip cervix was grasped with a

tenaculum.  The cervix and uterus were sounded to 8 cm before and after the 

case.  Cervix was gently dilated.  Hysteroscopy revealed an empty uterine 

cavity.  Endocervical and endometrial curettings were obtained.  The NovaSure 

device was placed tested and fired.  Length was 4 cm and the width was 4.5 cm.  

There were no complications.  At the end of the case all instruments were 

removed.  She is placed back in supine position and taken recovery in stable 

condition.

## 2020-06-19 ENCOUNTER — HOSPITAL ENCOUNTER (OUTPATIENT)
Dept: HOSPITAL 62 - RAD | Age: 40
End: 2020-06-19
Attending: CLINIC/CENTER
Payer: MEDICAID

## 2020-06-19 DIAGNOSIS — K82.8: Primary | ICD-10-CM

## 2020-06-19 PROCEDURE — A9537 TC99M MEBROFENIN: HCPCS

## 2020-06-19 PROCEDURE — 78227 HEPATOBIL SYST IMAGE W/DRUG: CPT

## 2020-06-19 NOTE — RADIOLOGY REPORT (SQ)
EXAM DESCRIPTION:  NM HIDA SCAN WITH CCK



IMAGES COMPLETED DATE/TIME:  6/19/2020 2:38 pm



REASON FOR STUDY:  OTHER SPECIFIED DISEASES OF GALLBLADDER (K82.8) K82.8  OTHER SPECIFIED DISEASES OF
 GALLBLADDER



COMPARISON:  None.



RADIONUCLIDE AND DOSE:  DOSAGE RADIONUCLIDE: 5 millicuries Tc99m Mebrofenin.

DOSAGE CCK: 2.5 micrograms.

DOSAGE MORPHINE: Not required.

The route of agent administration: Intravenous



TECHNIQUE:  Serial imaging right upper quadrant up to 60 minutes following injection of radionuclide.
  CCK injected after gallbladder visualized.



LIMITATIONS:  None.



FINDINGS:  LIVER: Normal visualization without areas of photopenia.

INTRAHEPATIC BILE DUCTS: Normal size and no delay in visualization.

COMMON BILE DUCT: Normal without dilatation.

GALLBLADDER:   Normal visualization.  Calculated ejection fraction of 2%. Normal range is greater delores
n 35%.

PHYSICAL RESPONSE:  Patients presenting complaint was reproduced.

OTHER: No other significant finding.



IMPRESSION:  Biliary dyskinesis with a gallbladder ejection fraction 2% where 35% or greater is consi
dered normal.  Patient's symptoms were reproduced with CCK administration.



TECHNICAL DOCUMENTATION:  JOB ID:  [id number]

 2011 [employer]- All Rights Reserved



Reading location - IP/workstation name: [email or web address]

## 2020-06-25 LAB
ALBUMIN SERPL-MCNC: 4.1 G/DL (ref 3.5–5)
ALP SERPL-CCNC: 62 U/L (ref 38–126)
ANION GAP SERPL CALC-SCNC: 5 MMOL/L (ref 5–19)
APPEARANCE UR: CLEAR
APTT PPP: YELLOW S
AST SERPL-CCNC: 22 U/L (ref 14–36)
BILIRUB DIRECT SERPL-MCNC: 0 MG/DL (ref 0–0.4)
BILIRUB SERPL-MCNC: 0.3 MG/DL (ref 0.2–1.3)
BILIRUB UR QL STRIP: NEGATIVE
BUN SERPL-MCNC: 17 MG/DL (ref 7–20)
CALCIUM: 8.9 MG/DL (ref 8.4–10.2)
CHLORIDE SERPL-SCNC: 104 MMOL/L (ref 98–107)
CO2 SERPL-SCNC: 27 MMOL/L (ref 22–30)
ERYTHROCYTE [DISTWIDTH] IN BLOOD BY AUTOMATED COUNT: 14.1 % (ref 11.5–14)
GLUCOSE SERPL-MCNC: 92 MG/DL (ref 75–110)
GLUCOSE UR STRIP-MCNC: NEGATIVE MG/DL
HCT VFR BLD CALC: 38.7 % (ref 36–47)
HGB BLD-MCNC: 13.4 G/DL (ref 12–15.5)
KETONES UR STRIP-MCNC: NEGATIVE MG/DL
MCH RBC QN AUTO: 28.6 PG (ref 27–33.4)
MCHC RBC AUTO-ENTMCNC: 34.6 G/DL (ref 32–36)
MCV RBC AUTO: 83 FL (ref 80–97)
NITRITE UR QL STRIP: NEGATIVE
PH UR STRIP: 7 [PH] (ref 5–9)
PLATELET # BLD: 241 10^3/UL (ref 150–450)
POTASSIUM SERPL-SCNC: 4.6 MMOL/L (ref 3.6–5)
PROT SERPL-MCNC: 7.2 G/DL (ref 6.3–8.2)
PROT UR STRIP-MCNC: NEGATIVE MG/DL
RBC # BLD AUTO: 4.68 10^6/UL (ref 3.72–5.28)
SP GR UR STRIP: 1.02
UROBILINOGEN UR-MCNC: NEGATIVE MG/DL (ref ?–2)
WBC # BLD AUTO: 7.3 10^3/UL (ref 4–10.5)

## 2020-06-29 ENCOUNTER — HOSPITAL ENCOUNTER (OUTPATIENT)
Dept: HOSPITAL 62 - OROUT | Age: 40
LOS: 1 days | Discharge: HOME | End: 2020-06-30
Attending: OBSTETRICS & GYNECOLOGY
Payer: MEDICAID

## 2020-06-29 DIAGNOSIS — N83.8: ICD-10-CM

## 2020-06-29 DIAGNOSIS — R10.2: ICD-10-CM

## 2020-06-29 DIAGNOSIS — Z03.818: ICD-10-CM

## 2020-06-29 DIAGNOSIS — N72: ICD-10-CM

## 2020-06-29 DIAGNOSIS — N93.9: Primary | ICD-10-CM

## 2020-06-29 DIAGNOSIS — N84.0: ICD-10-CM

## 2020-06-29 DIAGNOSIS — N94.6: ICD-10-CM

## 2020-06-29 DIAGNOSIS — Z91.040: ICD-10-CM

## 2020-06-29 DIAGNOSIS — E66.9: ICD-10-CM

## 2020-06-29 PROCEDURE — 80053 COMPREHEN METABOLIC PANEL: CPT

## 2020-06-29 PROCEDURE — 86901 BLOOD TYPING SEROLOGIC RH(D): CPT

## 2020-06-29 PROCEDURE — 88307 TISSUE EXAM BY PATHOLOGIST: CPT

## 2020-06-29 PROCEDURE — 81001 URINALYSIS AUTO W/SCOPE: CPT

## 2020-06-29 PROCEDURE — 86850 RBC ANTIBODY SCREEN: CPT

## 2020-06-29 PROCEDURE — 94799 UNLISTED PULMONARY SVC/PX: CPT

## 2020-06-29 PROCEDURE — 86900 BLOOD TYPING SEROLOGIC ABO: CPT

## 2020-06-29 PROCEDURE — 87635 SARS-COV-2 COVID-19 AMP PRB: CPT

## 2020-06-29 PROCEDURE — C9803 HOPD COVID-19 SPEC COLLECT: HCPCS

## 2020-06-29 PROCEDURE — 85027 COMPLETE CBC AUTOMATED: CPT

## 2020-06-29 PROCEDURE — 81025 URINE PREGNANCY TEST: CPT

## 2020-06-29 PROCEDURE — 58571 TLH W/T/O 250 G OR LESS: CPT

## 2020-06-29 PROCEDURE — 36415 COLL VENOUS BLD VENIPUNCTURE: CPT

## 2020-06-29 PROCEDURE — 86870 RBC ANTIBODY IDENTIFICATION: CPT

## 2020-06-29 RX ADMIN — KETOROLAC TROMETHAMINE SCH MG: 30 INJECTION, SOLUTION INTRAMUSCULAR at 17:26

## 2020-06-29 NOTE — OPERATIVE REPORT
Operative Report


DATE OF SURGERY: 06/29/20


PREOPERATIVE DIAGNOSIS: Pelvic pain and dysmenorrhea


POSTOPERATIVE DIAGNOSIS: Same


OPERATION: Da Hay hysterectomy and bilateral salpingectomy


SURGEON: LAURA CRENSHAW


1ST ASSISTANT: JUANY YAN


ANESTHESIA: GA


TISSUE REMOVED OR ALTERED: Uterus cervix and tubes


COMPLICATIONS: 





None


ESTIMATED BLOOD LOSS: 50 cc


INTRAOPERATIVE FINDINGS: Normal tubes and ovaries.  Patient had some adhesions 

to the back of the cervix.


PROCEDURE: 





Patient was taken the OR and placed in supine position.  General anesthesia was 

induced.  She is placed in dorsal lithotomy position using Dorian stirrups.  Her 

perineum abdomen vagina were prepared and draped in a sterile fashion her 

bladder was drained with a Le catheter.  Speculum was placed in the vagina 

and the cervix was grasped with a tenaculum.  Uterus sounded to 8 cm.  A suture 

was placed for retraction on the cervix.  The V care uterine manipulator was 

placed with a large cuff used on the cervix.  The balloon was inflated.





Attention was turned to the abdominal portion of the case.  2 fingerbreadths 

above the umbilicus and incision was made and carried down the level of fascia. 

The fascia was incised with curved Soto scissors.  The peritoneum was entered.  

The GelPort was placed.  The abdomen was insufflated with CO2 gas.  View of the 

abdomen was good.  The lateral ports were placed under laparoscopic 

visualization.  The robot was brought to the patient and docked.





The pelvis was inspected.  We had good visualization of the uterus.  The patient

was noted to have some adhesions to the back of the uterus which were mainly 

peritoneal in nature.  Next the salpingectomy was carried out bilaterally by 

cauterizing the mesosalpinx and incising with scissors.  The tubes were left 

attached to the uterus bilaterally.  The ovaries appeared normal.  Next the 

round ligaments were cauterized with bipolar and cut with monopolar paulo 

bilaterally.  The broad ligament likewise directly next to the uterus was 

cauterized with bipolar cautery and cut with monopolar paulo.  Staying directly

next to the uterine body while elevating the uterus up in the pelvis the 

remainder the broad ligament was cauterized with bipolar cautery and cut with 

monopolar paulo.  On reaching the uterine arteries these were also cauterized 

with bipolar cautery and cut with monopolar paulo bilaterally.  The anterior 

leaf of the broad ligament was incised creating a bladder flap.  Next staying 

directly next to the cervix the cardinal ligaments were cauterized bipolar 

cautery and cut with monopolar paulo bilaterally.  At this point the uterus 

could be elevated well in the pelvis.  The adhesions to the posterior cervix 

have been taken down sufficiently.  A circumferential incision was then made on 

the V care ring.  The uterus was removed through the vagina.  The vaginal cuff 

was inspected and there was no active bleeding.  The monopolar paulo were 

replaced with a needle  on the right.  The V lock suture was passed into 

the pelvis.  The vaginal cuff was then closed.  The surgeon started on the right

side of the incision of the vaginal cuff incorporating anterior vaginal mucosa 

lateral vaginal sidewall posterior vaginal mucosa.  The suture was looped and 

pulled tight.  The cuff was then closed from anterior to posterior incorporating

anterior anterior vaginal mucosa to posterior vaginal mucosa.  Upon reaching the

left angle anterior vaginal close the lateral vaginal sidewall and posterior 

vaginal mucosa were included.  Several sutures were taken back medially.  The 

suture was then cut and passed off out of the pelvis.





Pelvis was irrigated and suctioned free of fluid there is no active bleeding 

noted.  All pedicles were inspected including utero-ovarian uterine artery and 

vaginal angles.





The robot was then undocked from the patient.  The ports were removed.  The 

GelPort was removed as well.  The fascia at the abdominal incision was closed 

with a running layer of 2-0 Vicryl.  Several deep sutures were brought together 

to close the adipose tissue at this level as well and the skin was closed with a

running subcuticular 4-0 undyed Vicryl stitch.  The 2 remaining lateral 

incisions were closed with an interrupted 4-0 undyed Vicryl stitch.





Le catheter was removed and the patient was brought out of anesthesia.  She 

was taken to recovery in stable condition.

## 2020-06-30 VITALS — SYSTOLIC BLOOD PRESSURE: 114 MMHG | DIASTOLIC BLOOD PRESSURE: 61 MMHG

## 2020-06-30 RX ADMIN — KETOROLAC TROMETHAMINE SCH MG: 30 INJECTION, SOLUTION INTRAMUSCULAR at 02:41

## 2020-06-30 NOTE — PDOC DISCHARGE SUMMARY
Impression





- Admit/DC Date/PCP


Admission Date/Primary Care Provider: 


  





  MELISSA LEWIS MD





Discharge Date: 06/30/20





- Discharge Diagnosis


(1) Pelvic pain


Is this a current diagnosis for this admission?: Yes   





(2) Heavy menses


Is this a current diagnosis for this admission?: Yes   





- Assessment


Summary: 


underwent RATLH w/ b/l salpingectomy and IRASEMA.  Normal post operative course.  





- Additional Information


Resuscitation Status: Full Code


Discharge Diet: As Tolerated


Discharge Activity: Balance Activity w/Rest, No Driving, No Lifting Over 10 

Pounds, No Lifting/Push/Pulling, Pelvic Rest, No tub bath


Referrals: 


MELISSA LEWIS MD [Primary Care Provider] - 


LAURA CRENSHAW MD [ACTIVE STAFF] - 07/07/20 8:30 am (CALL THE OFFICE OF ANY 

QUESTIONS AND CONCERNS)


Prescriptions: 


Oxycodone HCl/Acetaminophen [Percocet 5-325 mg Tablet] 1 tab PO Q4HP PRN #30 

tablet


 PRN Reason: 


Docusate Sodium [Colace 100 mg Capsule] 100 mg PO BID #60 capsule


Ibuprofen [Motrin 800 mg Tablet] 800 mg PO Q6 #60 tablet


Home Medications: 








Dextroamphetamine/Amphetamine [Adderall 15 mg Tablet] 15 mg PO ASDIR PRN 

06/25/20 


Dextroamphetamine/Amphetamine [Adderall Xr 5 mg Capsule] 5 mg PO DAILY 06/25/20 


Docusate Sodium [Colace 100 mg Capsule] 100 mg PO BID #60 capsule 06/30/20 


Ibuprofen [Motrin 800 mg Tablet] 800 mg PO Q6 #60 tablet 06/30/20 


Oxycodone HCl/Acetaminophen [Percocet 5-325 mg Tablet] 1 tab PO Q4HP PRN #30 

tablet 06/30/20 











History of Present Illiness


History of Present Illness: 


SERAFIN LOFTON is a 40 year old female








Physical Exam





- Physical Exam


Vital Signs: 


                                        











Temp Pulse Resp BP Pulse Ox


 


 98.0 F   70   16   114/61   97 


 


 06/30/20 07:58  06/30/20 07:58  06/30/20 07:58  06/30/20 07:58  06/30/20 07:58








                                 Intake & Output











 06/29/20 06/30/20 07/01/20





 06:59 06:59 06:59


 


Intake Total  1900 


 


Output Total  1385 


 


Balance  515 


 


Weight  113.5 kg 














Results


Laboratory Results: 


                                        











WBC  7.3 10^3/uL (4.0-10.5)   06/25/20  10:48    


 


RBC  4.68 10^6/uL (3.72-5.28)   06/25/20  10:48    


 


Hgb  13.4 g/dL (12.0-15.5)   06/25/20  10:48    


 


Hct  38.7 % (36.0-47.0)   06/25/20  10:48    


 


MCV  83 fl (80-97)   06/25/20  10:48    


 


MCH  28.6 pg (27.0-33.4)   06/25/20  10:48    


 


MCHC  34.6 g/dL (32.0-36.0)   06/25/20  10:48    


 


RDW  14.1 % (11.5-14.0)  H  06/25/20  10:48    


 


Plt Count  241 10^3/uL (150-450)   06/25/20  10:48    


 


Sodium  136.3 mmol/L (137-145)  L  06/25/20  10:48    


 


Potassium  4.6 mmol/L (3.6-5.0)   06/25/20  10:48    


 


Chloride  104 mmol/L ()   06/25/20  10:48    


 


Carbon Dioxide  27 mmol/L (22-30)   06/25/20  10:48    


 


Anion Gap  5  (5-19)   06/25/20  10:48    


 


BUN  17 mg/dL (7-20)   06/25/20  10:48    


 


Creatinine  0.75 mg/dL (0.52-1.25)   06/25/20  10:48    


 


Est GFR ( Amer)  > 60  (>60)   06/25/20  10:48    


 


Est GFR (MDRD) Non-Af  > 60  (>60)   06/25/20  10:48    


 


Glucose  92 mg/dL ()   06/25/20  10:48    


 


Calcium  8.9 mg/dL (8.4-10.2)   06/25/20  10:48    


 


Total Bilirubin  0.3 mg/dL (0.2-1.3)   06/25/20  10:48    


 


Direct Bilirubin  0.0 mg/dL (0.0-0.4)   06/25/20  10:48    


 


Neonat Total Bilirubin  Not Reportable   06/25/20  10:48    


 


Neonat Direct Bilirubin  Not Reportable   06/25/20  10:48    


 


Neonat Indirect Bili  Not Reportable   06/25/20  10:48    


 


AST  22 U/L (14-36)   06/25/20  10:48    


 


ALT  22 U/L (<35)   06/25/20  10:48    


 


Alkaline Phosphatase  62 U/L ()   06/25/20  10:48    


 


Total Protein  7.2 g/dL (6.3-8.2)   06/25/20  10:48    


 


Albumin  4.1 g/dL (3.5-5.0)   06/25/20  10:48    


 


Urine Color  YELLOW   06/25/20  10:48    


 


Urine Appearance  CLEAR   06/25/20  10:48    


 


Urine pH  7.0  (5.0-9.0)   06/25/20  10:48    


 


Ur Specific Gravity  1.017   06/25/20  10:48    


 


Urine Protein  NEGATIVE mg/dL (NEGATIVE)   06/25/20  10:48    


 


Urine Glucose (UA)  NEGATIVE mg/dL (NEGATIVE)   06/25/20  10:48    


 


Urine Ketones  NEGATIVE mg/dL (NEGATIVE)   06/25/20  10:48    


 


Urine Blood  NEGATIVE  (NEGATIVE)   06/25/20  10:48    


 


Urine Nitrite  NEGATIVE  (NEGATIVE)   06/25/20  10:48    


 


Urine Bilirubin  NEGATIVE  (NEGATIVE)   06/25/20  10:48    


 


Urine Urobilinogen  NEGATIVE mg/dL (<2.0)   06/25/20  10:48    


 


Ur Leukocyte Esterase  NEGATIVE  (NEGATIVE)   06/25/20  10:48    


 


Urine WBC (Auto)  0 /HPF  06/25/20  10:48    


 


Urine RBC (Auto)  1 /HPF  06/25/20  10:48    


 


Urine Bacteria (Auto)  TRACE /HPF  06/25/20  10:48    


 


Squamous Epi Cells Auto  3 /HPF  06/25/20  10:48    


 


Urine Mucus (Auto)  RARE /LPF  06/25/20  10:48    


 


Urine Ascorbic Acid  NEGATIVE  (NEGATIVE)   06/25/20  10:48    


 


Urine HCG, Qual  NEGATIVE  (NEGATIVE)   06/29/20  07:45    


 


COVID-19 Source  NASOPHARYNGEAL   06/25/20  10:45    


 


COVID-19 (LAURITA)  NOT DETECTED   06/25/20  10:45    


 


Blood Type  Cancelled   06/30/20  06:20    


 


Antibody Screen  POSITIVE   06/27/20  08:26    


 


Antibody Identification  Anti-K   06/27/20  08:26    


 


Fetal Screen  Cancelled   06/30/20  06:20    














Stroke


Is this a Stroke Patient?: No





Acute Heart Failure





- **


Is this a Heart Failure Patient?: No

## 2020-07-27 LAB
ALBUMIN SERPL-MCNC: 4.2 G/DL (ref 3.5–5)
ALP SERPL-CCNC: 64 U/L (ref 38–126)
AMYLASE SERPL-CCNC: < 30 U/L (ref 30–110)
ANION GAP SERPL CALC-SCNC: 7 MMOL/L (ref 5–19)
AST SERPL-CCNC: 21 U/L (ref 14–36)
BILIRUB DIRECT SERPL-MCNC: 0 MG/DL (ref 0–0.4)
BILIRUB SERPL-MCNC: 0.5 MG/DL (ref 0.2–1.3)
BUN SERPL-MCNC: 15 MG/DL (ref 7–20)
CALCIUM: 8.9 MG/DL (ref 8.4–10.2)
CHLORIDE SERPL-SCNC: 105 MMOL/L (ref 98–107)
CO2 SERPL-SCNC: 25 MMOL/L (ref 22–30)
ERYTHROCYTE [DISTWIDTH] IN BLOOD BY AUTOMATED COUNT: 14.6 % (ref 11.5–14)
GLUCOSE SERPL-MCNC: 128 MG/DL (ref 75–110)
HCT VFR BLD CALC: 39.9 % (ref 36–47)
HGB BLD-MCNC: 13.6 G/DL (ref 12–15.5)
MCH RBC QN AUTO: 28.3 PG (ref 27–33.4)
MCHC RBC AUTO-ENTMCNC: 34 G/DL (ref 32–36)
MCV RBC AUTO: 83 FL (ref 80–97)
PLATELET # BLD: 210 10^3/UL (ref 150–450)
POTASSIUM SERPL-SCNC: 4.1 MMOL/L (ref 3.6–5)
PROT SERPL-MCNC: 7.1 G/DL (ref 6.3–8.2)
RBC # BLD AUTO: 4.79 10^6/UL (ref 3.72–5.28)
WBC # BLD AUTO: 6 10^3/UL (ref 4–10.5)

## 2020-07-30 ENCOUNTER — HOSPITAL ENCOUNTER (OUTPATIENT)
Dept: HOSPITAL 62 - OROUT | Age: 40
Discharge: HOME | End: 2020-07-30
Attending: SURGERY
Payer: MEDICAID

## 2020-07-30 VITALS — DIASTOLIC BLOOD PRESSURE: 84 MMHG | SYSTOLIC BLOOD PRESSURE: 138 MMHG

## 2020-07-30 DIAGNOSIS — Z88.8: ICD-10-CM

## 2020-07-30 DIAGNOSIS — K81.1: Primary | ICD-10-CM

## 2020-07-30 DIAGNOSIS — Z91.040: ICD-10-CM

## 2020-07-30 DIAGNOSIS — Z03.818: ICD-10-CM

## 2020-07-30 DIAGNOSIS — Z79.899: ICD-10-CM

## 2020-07-30 DIAGNOSIS — F98.8: ICD-10-CM

## 2020-07-30 PROCEDURE — 86901 BLOOD TYPING SEROLOGIC RH(D): CPT

## 2020-07-30 PROCEDURE — C9290 INJ, BUPIVACAINE LIPOSOME: HCPCS

## 2020-07-30 PROCEDURE — 80076 HEPATIC FUNCTION PANEL: CPT

## 2020-07-30 PROCEDURE — C9803 HOPD COVID-19 SPEC COLLECT: HCPCS

## 2020-07-30 PROCEDURE — 86900 BLOOD TYPING SEROLOGIC ABO: CPT

## 2020-07-30 PROCEDURE — 36415 COLL VENOUS BLD VENIPUNCTURE: CPT

## 2020-07-30 PROCEDURE — 47562 LAPAROSCOPIC CHOLECYSTECTOMY: CPT

## 2020-07-30 PROCEDURE — 86870 RBC ANTIBODY IDENTIFICATION: CPT

## 2020-07-30 PROCEDURE — 87635 SARS-COV-2 COVID-19 AMP PRB: CPT

## 2020-07-30 PROCEDURE — 82150 ASSAY OF AMYLASE: CPT

## 2020-07-30 PROCEDURE — 88304 TISSUE EXAM BY PATHOLOGIST: CPT

## 2020-07-30 PROCEDURE — 80048 BASIC METABOLIC PNL TOTAL CA: CPT

## 2020-07-30 PROCEDURE — 86850 RBC ANTIBODY SCREEN: CPT

## 2020-07-30 PROCEDURE — 85027 COMPLETE CBC AUTOMATED: CPT

## 2020-07-30 NOTE — DISCHARGE SUMMARY
Discharge Summary (SDC)





- Discharge


Final Diagnosis: 





Symptomatic cholelithiasis


Date of Surgery: 07/30/20


Discharge Date: 07/30/20


Condition: Good


Treatment or Instructions: 


Okay to shower tomorrow





Steri-Strips can get wet but should not be soaked


Prescriptions: 


Oxycodone HCl/Acetaminophen [Percocet 7.5-325 mg Tablet] 1 each PO Q6HP PRN #15 

tablet


 PRN Reason: 


Referrals: 


MELISSA LEWIS MD [Primary Care Provider] - 


Discharge Diet: As Tolerated


Discharge Activity: Activity As Tolerated, No Lifting Over 10 Pounds


Report the Following to Your Physician Immediately: Yellow Skin, Fever over 101 

Degrees, Unusual Bleeding

## 2020-07-30 NOTE — OPERATIVE REPORT
Nonrecallable Operative Report


DATE OF SURGERY: 07/30/20


PREOPERATIVE DIAGNOSIS: Symptomatic cholelithiasis


POSTOPERATIVE DIAGNOSIS: Symptomatic cholelithiasis


OPERATION: Laparoscopic cholecystectomy


SURGEON: RENAE EAGLE ASSISTANT: CHANTALE LIEBERMAN


ANESTHESIA: GA


TISSUE REMOVED OR ALTERED: Gallbladder


COMPLICATIONS: 





None


ESTIMATED BLOOD LOSS: 50 cc


INTRAOPERATIVE FINDINGS: See note


PROCEDURE: 





After obtaining informed consent, the patient was taken to the operating room.  

General  Anesthesia was induced; the arms were extended, and the abdomen was 

exposed, and prepped and draped in a sterile fashion.  Instrumentation was set 

up for laparoscopic cholecystectomy.


 


Surgical plan and surgical timeout were conducted.


 


A vertical incision was made above the umbilicus, and a verres needle was 

inserted uneventfully into the peritoneal cavity.  Pneumoperitoneum was 

established.


 


The verres needle was removed and a 10 mm trocar was inserted and a 10 mm 

laparoscope was inserted.  Visualization of the peritoneal cavity confirmed safe

uneventful entry.  Under direct visualization 3 additional 5 mm ports were 

established, one in the subxiphoid position and second in the subcostal 

position.  Visualization of the hepatobiliary anatomy revealed no anatomic 

variations.  A grasper was placed on the fundus of the gallbladder and the 

gallbladder is elevated over the right surface of the liver; a second grasper 

was used to grasp the infundibulum of the gallbladder.  The neck of the 

gallbladder and junction with the cystic duct was dissected out.  The Cystic 

artery was in its usual location medial and cephalad to the cystic duct.  The 

cystic artery was surrounded with a right angle clamp, clipped twice proximally 

and divided with laparoscopic scissors.


 


We now opened the triangle of Calot by dividing the peritoneal reflection on 

both the medial and lateral sides of the cystic duct infundibular junction.  The

critical view was obtained.  We now milked the cystic duct of any possible 

stones, clipped the cystic duct approximately 2 times once distally and divided 

with scissors.


 


The gallbladder was now removed from the undersurface of the liver using hook 

cautery dissection.  Graspers were repositioned and the gallbladder was removed 

uneventfully from the abdominal cavity through the super umbilical port site 

incision.  The specimen was examined, then passed off to pathology for permanent

analysis.


 


We returned to the peritoneal cavity check for bleeding, and evidence of bile 

leak, and there was none.  We  Confirmed satisfactory placement of clips on 

cystic duct and cystic artery were secured .  At this point we felt  the 

operation was complete.  The subcutaneous tissue was then anesthetized  with 

quarter percent Marcaine Sponge and needle counts are correct.  All ports 

removed under direct visualization pneumoperitoneum evacuated, and 5 mm port 

wounds closed with 3-0 Vicryl suture, benzoin and Steri-Strips.


 


The patient was extubated, and taken to the recovery room in stable condition.





Estimated blood loss was 25 cc





Chantale Rees was present for the entire operation for help with wound 

retraction wound closure